# Patient Record
Sex: FEMALE | Race: WHITE
[De-identification: names, ages, dates, MRNs, and addresses within clinical notes are randomized per-mention and may not be internally consistent; named-entity substitution may affect disease eponyms.]

---

## 2019-01-26 ENCOUNTER — HOSPITAL ENCOUNTER (EMERGENCY)
Dept: HOSPITAL 36 - ER | Age: 69
Discharge: ANOTHER HOSPITAL | End: 2019-01-26
Payer: COMMERCIAL

## 2019-01-26 DIAGNOSIS — E11.9: ICD-10-CM

## 2019-01-26 DIAGNOSIS — J20.9: Primary | ICD-10-CM

## 2019-01-26 DIAGNOSIS — J44.9: ICD-10-CM

## 2019-01-26 DIAGNOSIS — Z91.041: ICD-10-CM

## 2019-01-26 DIAGNOSIS — E78.5: ICD-10-CM

## 2019-01-26 DIAGNOSIS — I25.10: ICD-10-CM

## 2019-01-26 DIAGNOSIS — F20.9: ICD-10-CM

## 2019-01-26 DIAGNOSIS — I48.91: ICD-10-CM

## 2019-01-26 DIAGNOSIS — F32.9: ICD-10-CM

## 2019-01-26 LAB
ALBUMIN SERPL-MCNC: 3.5 GM/DL (ref 3.7–5.3)
ALBUMIN/GLOB SERPL: 0.9 {RATIO} (ref 1–1.8)
ALP SERPL-CCNC: 62 U/L (ref 34–104)
ALT SERPL-CCNC: 14 U/L (ref 7–52)
ANION GAP SERPL CALC-SCNC: 11.9 MMOL/L (ref 7–16)
AST SERPL-CCNC: 21 U/L (ref 13–39)
BASOPHILS NFR BLD: 0 % (ref 0–3)
BILIRUB SERPL-MCNC: 0.4 MG/DL (ref 0.3–1)
BUN SERPL-MCNC: 55 MG/DL (ref 7–25)
CALCIUM SERPL-MCNC: 9.4 MG/DL (ref 8.6–10.3)
CHLORIDE SERPL-SCNC: 97 MEQ/L (ref 98–107)
CO2 SERPL-SCNC: 32.5 MEQ/L (ref 21–31)
CREAT SERPL-MCNC: 1.7 MG/DL (ref 0.6–1.2)
EOSINOPHIL NFR BLD: 1 % (ref 0–5)
ERYTHROCYTE [DISTWIDTH] IN BLOOD BY AUTOMATED COUNT: 16.2 % (ref 11.5–20)
GLOBULIN SER-MCNC: 3.8 GM/DL
GLUCOSE SERPL-MCNC: 259 MG/DL (ref 70–105)
HCT VFR BLD CALC: 26.9 % (ref 41–60)
HGB BLD-MCNC: 8.7 GM/DL (ref 12–16)
INR PPP: 1.64 (ref 0.5–1.4)
LYMPHOCYTES # BLD MANUAL: 8 % (ref 20–50)
MCH RBC QN AUTO: 26.9 PG (ref 27–31)
MCHC RBC AUTO-ENTMCNC: 32.3 PG (ref 28–36)
MCV RBC AUTO: 83.4 FL (ref 81–100)
MONOCYTES # BLD MANUAL: 7 % (ref 2–10)
NEUTROPHILS NFR BLD AUTO: 84 % (ref 40–80)
NEUTS BAND NFR BLD: 0 % (ref 0–10)
PLATELET # BLD: 199 TH/CMM (ref 150–400)
PMV BLD AUTO: 11.2 FL
POTASSIUM SERPL-SCNC: 4.4 MEQ/L (ref 3.5–5.1)
PROTHROMBIN TIME: 16.6 SECONDS (ref 9.5–11.5)
RBC # BLD AUTO: 3.23 MIL/CMM (ref 3.8–5.2)
SODIUM SERPL-SCNC: 137 MEQ/L (ref 136–145)
WBC # BLD AUTO: 10.9 TH/CMM (ref 4.8–10.8)

## 2019-01-26 PROCEDURE — 80162 ASSAY OF DIGOXIN TOTAL: CPT

## 2019-01-26 PROCEDURE — 85730 THROMBOPLASTIN TIME PARTIAL: CPT

## 2019-01-26 PROCEDURE — 83605 ASSAY OF LACTIC ACID: CPT

## 2019-01-26 PROCEDURE — 84484 ASSAY OF TROPONIN QUANT: CPT

## 2019-01-26 PROCEDURE — 84443 ASSAY THYROID STIM HORMONE: CPT

## 2019-01-26 PROCEDURE — 87040 BLOOD CULTURE FOR BACTERIA: CPT

## 2019-01-26 PROCEDURE — 83036 HEMOGLOBIN GLYCOSYLATED A1C: CPT

## 2019-01-26 PROCEDURE — 36415 COLL VENOUS BLD VENIPUNCTURE: CPT

## 2019-01-26 PROCEDURE — 85610 PROTHROMBIN TIME: CPT

## 2019-01-26 PROCEDURE — 99285 EMERGENCY DEPT VISIT HI MDM: CPT

## 2019-01-26 PROCEDURE — 96372 THER/PROPH/DIAG INJ SC/IM: CPT

## 2019-01-26 PROCEDURE — 94640 AIRWAY INHALATION TREATMENT: CPT

## 2019-01-26 PROCEDURE — 93005 ELECTROCARDIOGRAM TRACING: CPT

## 2019-01-26 PROCEDURE — 85025 COMPLETE CBC W/AUTO DIFF WBC: CPT

## 2019-01-26 PROCEDURE — 85007 BL SMEAR W/DIFF WBC COUNT: CPT

## 2019-01-26 PROCEDURE — 71045 X-RAY EXAM CHEST 1 VIEW: CPT

## 2019-01-26 PROCEDURE — Z7610: HCPCS

## 2019-01-26 PROCEDURE — 80053 COMPREHEN METABOLIC PANEL: CPT

## 2019-01-26 NOTE — ED PHYSICIAN CHART
ED Chief Complaint/HPI





- Patient Information


Date Seen:: 01/26/19


Time Seen:: 17:00


Chief Complaint:: chest congestion


Allergies:: 


 Allergies











Allergy/AdvReac Type Severity Reaction Status Date / Time


 


iodine Allergy   Verified 01/26/19 16:50











Vitals:: 


 Vital Signs - 8 hr











  01/26/19





  16:43


 


Temp 97.6 F


 


HR 80


 


RR 22


 


/86


 


O2 Sat % 97











Review:: Nurse's Note Reviewed, Transfer documents Reviewed





ED Past Medical History





- Past Medical History


Obtainable: Yes


Past Medical History: DM, CAD, Asthma/COPD, Dyslipidemia


Family History: None


Social History: Non Smoker, No Alcohol, No Drug Use, Care Facility


Surgical History: None


Psychiatricy History: Depression, Schizophrenia





Family Medical History





- Family Member


  ** Mother


History Unknown: Yes





ED Physical Exam





- Physical Examination


General/Constitutional: Awake, Well-developed, well-nourished, Alert, No 

distress, GCS 15, Non-toxic appearing, Ambulatory


Head: Atraumatic


Eyes: Lids, conjuctiva normal, PERRL, EOMI


Skin: Nl inspection, No rash, No skin lesions, No ecchymosis, Well hydrated, No 

lymphadenopathy


ENMT: External ears, nose nl, Nasal exam nl, Lips, teeth, gums nl


Neck: Nontender, Full ROM w/o pain, No JVD, No nuchal rigidity, No bruit, No 

mass, No stridor


Respiratory: Nl effort/Exclusion, Clear to Auscultation, No Wheeze/Rhonchi/

Rales (bilateral rhonchi heard)


Cardio Vascular: RRR, No murmur, gallop, rubs, NL S1 S2


GI: No tenderness/rebounding/guarding, No organomegaly, No hernia, Normal BS's, 

Nondistended, No mass/bruits, No McBurney tenderness


: No CVA tenderness


Extremities: No tenderness or effusion, Full ROM, normal strength in all 

extremities, No edema, Normal digits & nails


Neuro/Psych: Alert/oriented, DTR's symmetric, Normal sensory exam, Normal motor 

strength, Judgement/insight normal, Mood normal, Normal gait, No focal deficits


Misc: Normal back, No paraspinal tenderness





ED Labs/Radiology/EKG Results





- Lab Results


Results: 





 Abnormal Lab Results











  01/26/19 01/26/19 01/26/19





  16:30 16:30 16:30


 


WBC   


 


RBC   


 


Hgb   


 


Hct   


 


MCV   


 


MCH   


 


MCHC Differential   


 


RDW   


 


Plt Count   


 


MPV   


 


Add Manual Diff   


 


PT  16.6 H  


 


INR  1.64 H  


 


PTT (Actin FS)  44.6 H  


 


Sodium   137 


 


Potassium   4.4 


 


Chloride   97 L 


 


Carbon Dioxide   32.5 H 


 


Anion Gap   11.9 


 


BUN   55 H 


 


Creatinine   1.7 H 


 


Est GFR ( Amer)   38.3 


 


Est GFR (Non-Af Amer)   31.7 


 


BUN/Creatinine Ratio   32.4 


 


Glucose   259 H 


 


Calcium   9.4 


 


Total Bilirubin   0.4 


 


AST   21 


 


ALT   14 


 


Alkaline Phosphatase   62 


 


Troponin I    0.05


 


Total Protein   7.3 


 


Albumin   3.5 L 


 


Globulin   3.8 


 


Albumin/Globulin Ratio   0.9 L 


 


TSH   














  01/26/19 01/26/19





  16:30 16:30


 


WBC   10.9 H


 


RBC   3.23 L


 


Hgb   8.7 L


 


Hct   26.9 L


 


MCV   83.4


 


MCH   26.9 L


 


MCHC Differential   32.3


 


RDW   16.2


 


Plt Count   199


 


MPV   11.2


 


Add Manual Diff   YES


 


PT  


 


INR  


 


PTT (Actin FS)  


 


Sodium  


 


Potassium  


 


Chloride  


 


Carbon Dioxide  


 


Anion Gap  


 


BUN  


 


Creatinine  


 


Est GFR ( Amer)  


 


Est GFR (Non-Af Amer)  


 


BUN/Creatinine Ratio  


 


Glucose  


 


Calcium  


 


Total Bilirubin  


 


AST  


 


ALT  


 


Alkaline Phosphatase  


 


Troponin I  


 


Total Protein  


 


Albumin  


 


Globulin  


 


Albumin/Globulin Ratio  


 


TSH  1.10 














- Radiology Results


Results: 





chest x-ray =  bilateral congestion noted and cardiomegaly





- EKG Interpretations


EKG Time:: 18:56


Rate & Rhythm: rate -=119, atrial fib


Axis: left axis





ED Assessment





- Assessment


General Assessment: 





acute respiratory distress





ED Septic Shock





- .


Is Septic Shock (SBP<90, OR Lactate>4 mmol\L) present?: No





- <6hrs of presentation:


Vital Signs: 


 Vital Signs - 8 hr











  01/26/19





  16:43


 


Temp 97.6 F


 


HR 80


 


RR 22


 


/86


 


O2 Sat % 97














ED Reassessment (Disposition)





- Reassessment


Reassessment Condition:: Improved





- Diagnosis


Diagnosis:: 





acute bronchitis


atrial fib





- Aftercare/Follow up Instructions


Notes:: 





tansfered to [email or web address]





- Patient Disposition


Discharge/Transfer:: Acute Care (other hosp)


Transport Method:: ACLS


Condition at Disposition:: Improved

## 2019-01-27 NOTE — DIAGNOSTIC IMAGING REPORT
Portable chest x-ray



HISTORY: Shortness of breath



The heart is enlarged.  There is a degree of pulmonary vascular

redistribution suggesting cardiac decompensation the interstitial lung

markings.



IMPRESSION:

1.  Cardiomegaly along with changes suggesting a mild degree of

congestive heart failure.  Clinical correlation is needed.